# Patient Record
Sex: MALE | Race: WHITE | NOT HISPANIC OR LATINO | Employment: FULL TIME | URBAN - METROPOLITAN AREA
[De-identification: names, ages, dates, MRNs, and addresses within clinical notes are randomized per-mention and may not be internally consistent; named-entity substitution may affect disease eponyms.]

---

## 2018-09-20 ENCOUNTER — OFFICE VISIT (OUTPATIENT)
Dept: FAMILY MEDICINE CLINIC | Facility: OTHER | Age: 28
End: 2018-09-20
Payer: COMMERCIAL

## 2018-09-20 VITALS
OXYGEN SATURATION: 98 % | BODY MASS INDEX: 24.97 KG/M2 | TEMPERATURE: 97.3 F | HEIGHT: 70 IN | WEIGHT: 174.44 LBS | HEART RATE: 63 BPM | DIASTOLIC BLOOD PRESSURE: 76 MMHG | SYSTOLIC BLOOD PRESSURE: 118 MMHG

## 2018-09-20 DIAGNOSIS — Z00.00 WELL ADULT EXAM: Primary | ICD-10-CM

## 2018-09-20 DIAGNOSIS — Z87.898 HISTORY OF SEIZURE: ICD-10-CM

## 2018-09-20 DIAGNOSIS — I47.1 PAROXYSMAL SUPRAVENTRICULAR TACHYCARDIA (HCC): ICD-10-CM

## 2018-09-20 DIAGNOSIS — Z13.220 SCREENING, LIPID: ICD-10-CM

## 2018-09-20 DIAGNOSIS — Z13.1 SCREENING FOR DIABETES MELLITUS: ICD-10-CM

## 2018-09-20 PROCEDURE — 99385 PREV VISIT NEW AGE 18-39: CPT | Performed by: FAMILY MEDICINE

## 2018-09-20 NOTE — PROGRESS NOTES
Subjective:      Patient ID: Ruby Palomo is a 25 y o  male  HPI   Pt presents for annual physical/wellness exam  Overall health good  Regular eye/dental exams  Diet: healthy, diverse -- protein from lean meat/fish   Exercise: cardio/strength a few times per wk  Colonoscopy: n/a  Last PSA: n/a  Vaccines: Tdap 2014      The following portions of the patient's history were reviewed and updated as appropriate: allergies, current medications, past family history, past medical history, past social history, past surgical history and problem list     No current outpatient prescriptions on file  Review of Systems   Constitutional: Negative for appetite change, fatigue, fever and unexpected weight change  HENT: Negative for congestion, dental problem, ear pain, postnasal drip, sore throat and tinnitus  Eyes: Negative for pain, discharge and visual disturbance  Respiratory: Negative for cough, shortness of breath and wheezing  Cardiovascular: Negative for chest pain, palpitations and leg swelling  Gastrointestinal: Negative for abdominal pain, constipation, diarrhea, nausea and vomiting  Endocrine: Negative for cold intolerance and heat intolerance  Genitourinary: Negative for difficulty urinating, dysuria, flank pain and urgency  Musculoskeletal: Negative for arthralgias, back pain, joint swelling and myalgias  Skin: Negative for rash and wound  Allergic/Immunologic: Negative for immunocompromised state  Neurological: Negative for dizziness, syncope, speech difficulty, weakness and numbness  Hematological: Negative for adenopathy  Does not bruise/bleed easily  Psychiatric/Behavioral: Negative for confusion, dysphoric mood and sleep disturbance  The patient is not nervous/anxious            Objective:      /76 (BP Location: Left arm, Patient Position: Sitting, Cuff Size: Adult)   Pulse 63   Temp (!) 97 3 °F (36 3 °C) (Tympanic)   Ht 5' 9 5" (1 765 m)   Wt 79 1 kg (174 lb 7 oz)   SpO2 98%   BMI 25 39 kg/m²          Physical Exam   Constitutional: He is oriented to person, place, and time  He appears well-developed and well-nourished  No distress  HENT:   Head: Normocephalic and atraumatic  Right Ear: Hearing, tympanic membrane, external ear and ear canal normal    Left Ear: Hearing, tympanic membrane, external ear and ear canal normal    Nose: Nose normal    Mouth/Throat: Uvula is midline, oropharynx is clear and moist and mucous membranes are normal  No oropharyngeal exudate  Eyes: Conjunctivae and EOM are normal  Pupils are equal, round, and reactive to light  No scleral icterus  Neck: Normal range of motion  Neck supple  No thyromegaly present  Cardiovascular: Normal rate, regular rhythm and normal heart sounds  No murmur heard  Pulmonary/Chest: Effort normal and breath sounds normal  No respiratory distress  He has no wheezes  He has no rales  Abdominal: Soft  Bowel sounds are normal  He exhibits no mass  There is no tenderness  Musculoskeletal: Normal range of motion  He exhibits no edema or tenderness  Lymphadenopathy:     He has no cervical adenopathy  Neurological: He is alert and oriented to person, place, and time  He has normal reflexes  No cranial nerve deficit  Skin: Skin is warm and dry  No rash noted  No erythema  Psychiatric: He has a normal mood and affect  His behavior is normal    Vitals reviewed  Assessment/Plan:   Diagnoses and all orders for this visit:    Well adult exam    Paroxysmal supraventricular tachycardia ([address] )    History of seizure    Screening, lipid  -     Lipid panel; Future    Screening for diabetes mellitus  -     Comprehensive metabolic panel; Future          Healthy appearing 75-year-old male presents to establish care  Reviewed the importance of healthy diet and regular exercise in maintaining overall health  History of SVT and seizure stable at this time without medication    Will check routine screening blood work to assess lipid status and screen for diabetes  Tdap given in 2014--flu shot declined today  RTO 1 yr for well adult    The patient indicates understanding of these issues and agrees with the plan          Rubina Ron, DO

## 2018-10-09 LAB
ALBUMIN SERPL-MCNC: 4.3 G/DL (ref 3.6–5.1)
ALBUMIN/GLOB SERPL: 1.5 (CALC) (ref 1–2.5)
ALP SERPL-CCNC: 27 U/L (ref 40–115)
ALT SERPL-CCNC: 17 U/L (ref 9–46)
AST SERPL-CCNC: 16 U/L (ref 10–40)
BILIRUB SERPL-MCNC: 0.7 MG/DL (ref 0.2–1.2)
BUN SERPL-MCNC: 17 MG/DL (ref 7–25)
BUN/CREAT SERPL: ABNORMAL (CALC) (ref 6–22)
CALCIUM SERPL-MCNC: 9.6 MG/DL (ref 8.6–10.3)
CHLORIDE SERPL-SCNC: 103 MMOL/L (ref 98–110)
CHOLEST SERPL-MCNC: 150 MG/DL
CHOLEST/HDLC SERPL: 2.3 (CALC)
CO2 SERPL-SCNC: 29 MMOL/L (ref 20–32)
CREAT SERPL-MCNC: 0.93 MG/DL (ref 0.6–1.35)
GLOBULIN SER CALC-MCNC: 2.8 G/DL (CALC) (ref 1.9–3.7)
GLUCOSE SERPL-MCNC: 95 MG/DL (ref 65–99)
HDLC SERPL-MCNC: 64 MG/DL
LDLC SERPL CALC-MCNC: 72 MG/DL (CALC)
NONHDLC SERPL-MCNC: 86 MG/DL (CALC)
POTASSIUM SERPL-SCNC: 4.2 MMOL/L (ref 3.5–5.3)
PROT SERPL-MCNC: 7.1 G/DL (ref 6.1–8.1)
SL AMB EGFR AFRICAN AMERICAN: 127 ML/MIN/1.73M2
SL AMB EGFR NON AFRICAN AMERICAN: 110 ML/MIN/1.73M2
SODIUM SERPL-SCNC: 139 MMOL/L (ref 135–146)
TRIGL SERPL-MCNC: 61 MG/DL

## 2018-10-11 ENCOUNTER — TELEPHONE (OUTPATIENT)
Dept: FAMILY MEDICINE CLINIC | Facility: OTHER | Age: 28
End: 2018-10-11

## 2018-10-11 NOTE — TELEPHONE ENCOUNTER
Pt notified   ----- Message from Alley Cadena MD sent at 10/10/2018  4:49 PM EDT -----  The blood work result is normal

## 2022-05-02 ENCOUNTER — OFFICE VISIT (OUTPATIENT)
Dept: FAMILY MEDICINE CLINIC | Facility: CLINIC | Age: 32
End: 2022-05-02
Payer: COMMERCIAL

## 2022-05-02 VITALS
TEMPERATURE: 98.9 F | HEIGHT: 69 IN | WEIGHT: 175 LBS | SYSTOLIC BLOOD PRESSURE: 118 MMHG | RESPIRATION RATE: 16 BRPM | BODY MASS INDEX: 25.92 KG/M2 | HEART RATE: 76 BPM | OXYGEN SATURATION: 98 % | DIASTOLIC BLOOD PRESSURE: 80 MMHG

## 2022-05-02 DIAGNOSIS — J06.9 ACUTE URI: Primary | ICD-10-CM

## 2022-05-02 PROCEDURE — 3008F BODY MASS INDEX DOCD: CPT | Performed by: FAMILY MEDICINE

## 2022-05-02 PROCEDURE — 99203 OFFICE O/P NEW LOW 30 MIN: CPT | Performed by: FAMILY MEDICINE

## 2022-05-02 PROCEDURE — 1036F TOBACCO NON-USER: CPT | Performed by: FAMILY MEDICINE

## 2022-05-02 PROCEDURE — 3725F SCREEN DEPRESSION PERFORMED: CPT | Performed by: FAMILY MEDICINE

## 2022-05-02 RX ORDER — AZITHROMYCIN 250 MG/1
TABLET, FILM COATED ORAL
Qty: 6 TABLET | Refills: 0 | Status: SHIPPED | OUTPATIENT
Start: 2022-05-02 | End: 2022-05-07

## 2022-05-02 NOTE — ASSESSMENT & PLAN NOTE
Pt has had sxs for close to 10 days now  Getting better but still has congestion  Will start abxs and pt to continue rest and fluids  Pt to take advil as needed for discomfort and can continue daquil/nyquil for other sxs  Call if worsens  Pt also advise to do home COVID test to check if it was COVID

## 2022-05-02 NOTE — PROGRESS NOTES
BMI Counseling: Body mass index is 25 84 kg/m²  The BMI is above normal  Nutrition recommendations include decreasing portion sizes, encouraging healthy choices of fruits and vegetables, consuming healthier snacks and moderation in carbohydrate intake  Exercise recommendations include exercising 3-5 times per week  No pharmacotherapy was ordered  Rationale for BMI follow-up plan is due to patient being overweight or obese  Depression Screening and Follow-up Plan: Patient was screened for depression during today's encounter  They screened negative with a PHQ-2 score of 0  Assessment/Plan:         Problem List Items Addressed This Visit        Respiratory    Acute URI - Primary     Pt has had sxs for close to 10 days now  Getting better but still has congestion  Will start abxs and pt to continue rest and fluids  Pt to take advil as needed for discomfort and can continue daquil/nyquil for other sxs  Call if worsens  Pt also advise to do home COVID test to check if it was COVID  Relevant Medications    azithromycin (Zithromax) 250 mg tablet            Subjective:      Patient ID: Arthur Flower is a 33 y o  male  Pt here for cold sxs starting on 4/23  Had low grade fever for 1-2 days and had aches and fatigue  Doing better wuth those sxs now  Still has sinus congestion, sinus pressure, and pain in teeth  Has yellow mucus  Has mild cough as well  Had sick contacts(son)  Taking daquil as needed  Not getting better  No sob or chest tightness  No n/v/d  No loss of taste or smell  Hasn't had COVID vaccines  Had Kiran in Nov 2021  The following portions of the patient's history were reviewed and updated as appropriate:   Past Medical History:  He has a past medical history of Anxiety, Concussion, Seizures ([facility] ), and SVT (supraventricular tachycardia) ([facility] [address] ) ([id number])  ,  _______________________________________________________________________  Medical Problems:  does not have any pertinent problems on file ,  _______________________________________________________________________  Past Surgical History:   has a past surgical history that includes [employer] tooth extraction and Hernia repair  ,  _______________________________________________________________________  Family History:  family history includes Breast cancer in his paternal grandmother; Heart Valve Disease in his mother; Heart attack in his maternal grandfather and paternal grandfather; Heart disease in his maternal grandfather and paternal grandfather; Hypertension in his father; No Known Problems in his brother, brother, daughter, maternal grandmother, and son ,  _______________________________________________________________________  Social History:   reports that he has never smoked  He has never used smokeless tobacco  He reports that he does not drink alcohol and does not use drugs  ,  _______________________________________________________________________  Allergies:  has No Known Allergies     _______________________________________________________________________  Current Outpatient Medications   Medication Sig Dispense Refill    azithromycin (Zithromax) 250 mg tablet Take 2 tablets (500 mg total) by mouth daily for 1 day, THEN 1 tablet (250 mg total) daily for 4 days  6 tablet 0     No current facility-administered medications for this visit      _______________________________________________________________________  Review of Systems   Constitutional: Negative for chills, fatigue and fever  HENT: Positive for congestion and sinus pressure  Negative for ear pain, postnasal drip, rhinorrhea, sinus pain, sore throat and trouble swallowing  Respiratory: Positive for cough  Negative for chest tightness, shortness of breath and wheezing  Cardiovascular: Negative for chest pain  Gastrointestinal: Negative for abdominal pain, diarrhea, nausea and vomiting  Musculoskeletal: Negative for arthralgias  Skin: Negative for rash  Neurological: Negative for dizziness and headaches  Objective:  Vitals:    05/02/22 1356   BP: 118/80   BP Location: Left arm   Patient Position: Sitting   Cuff Size: Standard   Pulse: 76   Resp: 16   Temp: 98 9 °F (37 2 °C)   TempSrc: Temporal   SpO2: 98%   Weight: 79 4 kg (175 lb)   Height: 5' 9" (1 753 m)     Body mass index is 25 84 kg/m²  Physical Exam  Vitals and nursing note reviewed  Constitutional:       Appearance: He is well-developed  He is not ill-appearing or toxic-appearing  HENT:      Right Ear: Tympanic membrane and ear canal normal       Left Ear: Tympanic membrane and ear canal normal       Nose: Congestion present  No rhinorrhea  Comments: TTP in cheeks and temples     Mouth/Throat:      Mouth: Mucous membranes are moist  No oral lesions  Pharynx: Oropharynx is clear  Uvula midline  No oropharyngeal exudate, posterior oropharyngeal erythema or uvula swelling  Tonsils: No tonsillar exudate  Cardiovascular:      Rate and Rhythm: Normal rate and regular rhythm  Heart sounds: Normal heart sounds  No murmur heard  Pulmonary:      Effort: Pulmonary effort is normal  No respiratory distress  Breath sounds: Rhonchi present  No wheezing  Musculoskeletal:      Cervical back: Normal range of motion and neck supple  Lymphadenopathy:      Cervical: Cervical adenopathy present

## 2022-10-11 PROBLEM — J06.9 ACUTE URI: Status: RESOLVED | Noted: 2022-05-02 | Resolved: 2022-10-11

## 2023-11-30 ENCOUNTER — OFFICE VISIT (OUTPATIENT)
Dept: FAMILY MEDICINE CLINIC | Facility: CLINIC | Age: 33
End: 2023-11-30
Payer: COMMERCIAL

## 2023-11-30 VITALS
HEIGHT: 69 IN | RESPIRATION RATE: 16 BRPM | BODY MASS INDEX: 26.96 KG/M2 | TEMPERATURE: 96.7 F | DIASTOLIC BLOOD PRESSURE: 72 MMHG | OXYGEN SATURATION: 98 % | HEART RATE: 61 BPM | SYSTOLIC BLOOD PRESSURE: 118 MMHG | WEIGHT: 182 LBS

## 2023-11-30 DIAGNOSIS — J06.9 URI, ACUTE: ICD-10-CM

## 2023-11-30 DIAGNOSIS — J06.9 ACUTE URI: Primary | ICD-10-CM

## 2023-11-30 PROCEDURE — 99213 OFFICE O/P EST LOW 20 MIN: CPT | Performed by: FAMILY MEDICINE

## 2023-11-30 RX ORDER — AZITHROMYCIN 250 MG/1
TABLET, FILM COATED ORAL
Qty: 6 TABLET | Refills: 0 | Status: SHIPPED | OUTPATIENT
Start: 2023-11-30 | End: 2023-12-05

## 2023-11-30 RX ORDER — DEXTROMETHORPHAN HYDROBROMIDE AND PROMETHAZINE HYDROCHLORIDE 15; 6.25 MG/5ML; MG/5ML
5 SYRUP ORAL
Qty: 60 ML | Refills: 1 | Status: SHIPPED | OUTPATIENT
Start: 2023-11-30

## 2023-11-30 NOTE — PROGRESS NOTES
Name: Elizabeth Mcbride      : 1990      MRN: [id number]  Encounter Provider: Kaley Orellana MD  Encounter Date: 2023   Encounter department: [facility] MEDICINE    Assessment & Plan     1. Acute URI  -     azithromycin (ZITHROMAX) 250 mg tablet; Take 2 tablets today then 1 tablet daily x 4 days  -     promethazine-dextromethorphan (PHENERGAN-DM) 6.25-15 mg/5 mL oral syrup; Take 5 mL by mouth daily at bedtime    2. URI, acute  Assessment & Plan:  Persistent cough  almost 2 weeks will treat zpak  and cough  syrup for nighttime          Depression Screening and Follow-up Plan: Patient was screened for depression during today's encounter. They screened negative with a PHQ-2 score of 0. Subjective      Cough  Pertinent negatives include no chest pain, chills, ear pain, fever, headaches, myalgias, rhinorrhea, sore throat, shortness of breath or wheezing. pt with 10 -12 days of cough with productive  children at home with similar symptoms non smoker  no fever ; cough worse at night   Review of Systems   Constitutional:  Negative for chills and fever. HENT:  Negative for congestion, ear pain, mouth sores, rhinorrhea, sinus pressure, sinus pain, sore throat and trouble swallowing. Eyes:  Negative for discharge. Respiratory:  Positive for cough. Negative for chest tightness, shortness of breath and wheezing. Cardiovascular:  Negative for chest pain. Musculoskeletal:  Negative for arthralgias and myalgias. Neurological:  Negative for headaches. No current outpatient medications on file prior to visit. Objective     /72 (BP Location: Left arm, Patient Position: Sitting, Cuff Size: Standard)   Pulse 61   Temp (!) 96.7 °F (35.9 °C) (Tympanic)   Resp 16   Ht 5' 9" (1.753 m)   Wt 82.6 kg (182 lb)   SpO2 98%   BMI 26.88 kg/m²     Physical Exam  Constitutional:       General: He is not in acute distress. Appearance: Normal appearance.  He is well-developed. He is not ill-appearing. HENT:      Right Ear: Tympanic membrane and ear canal normal.      Left Ear: Tympanic membrane and ear canal normal.      Nose: Nose normal.      Right Sinus: No maxillary sinus tenderness or frontal sinus tenderness. Left Sinus: No maxillary sinus tenderness or frontal sinus tenderness. Mouth/Throat:      Mouth: Mucous membranes are moist.      Pharynx: No oropharyngeal exudate or posterior oropharyngeal erythema. Tonsils: No tonsillar exudate. Eyes:      General:         Right eye: No discharge. Left eye: No discharge. Conjunctiva/sclera: Conjunctivae normal.      Pupils: Pupils are equal, round, and reactive to light. Cardiovascular:      Rate and Rhythm: Normal rate. Heart sounds: Normal heart sounds. Pulmonary:      Effort: Pulmonary effort is normal.      Breath sounds: Normal breath sounds. Musculoskeletal:      Cervical back: Normal range of motion. Lymphadenopathy:      Cervical: No cervical adenopathy. Neurological:      General: No focal deficit present. Mental Status: He is alert. Mental status is at baseline.    Psychiatric:         Mood and Affect: Mood normal.       Brooklyn Webster MD

## 2024-01-29 PROBLEM — J06.9 URI, ACUTE: Status: RESOLVED | Noted: 2023-11-30 | Resolved: 2024-01-29

## 2024-04-10 ENCOUNTER — OFFICE VISIT (OUTPATIENT)
Dept: CARDIOLOGY CLINIC | Facility: CLINIC | Age: 34
End: 2024-04-10
Payer: COMMERCIAL

## 2024-04-10 VITALS
DIASTOLIC BLOOD PRESSURE: 76 MMHG | OXYGEN SATURATION: 96 % | WEIGHT: 187.2 LBS | HEIGHT: 69 IN | HEART RATE: 66 BPM | SYSTOLIC BLOOD PRESSURE: 122 MMHG | BODY MASS INDEX: 27.73 KG/M2 | TEMPERATURE: 98 F

## 2024-04-10 DIAGNOSIS — I47.10 PAROXYSMAL SUPRAVENTRICULAR TACHYCARDIA: Primary | ICD-10-CM

## 2024-04-10 PROCEDURE — 99203 OFFICE O/P NEW LOW 30 MIN: CPT | Performed by: INTERNAL MEDICINE

## 2024-04-10 PROCEDURE — 93000 ELECTROCARDIOGRAM COMPLETE: CPT | Performed by: INTERNAL MEDICINE

## 2024-04-10 NOTE — PROGRESS NOTES
[facility]    CHIEF COMPLAINT: No chief complaint on file.      HPI:  Cliff Ashton is a 33 y.o. male with a past medical history of paroxysmal supraventricular tachycardia.  He was diagnosed with this around the age of 18 and reports having episodes of palpitations since his teenage years.  Episodes were infrequent and would last for several seconds at a time.  He does not feel there were many episodes that have lasted more than 10 seconds. No associated syncope, chest pain, shortness of breath.    Over the past year, he feels episodes are occurring a bit more frequently perhaps once per week..  They tend to be worse with stress.  Episodes of palpitations also seem to be aggravated with lack of sleep and dehydration. No episodes of lasted for greater than 1 minute.  Palpitations typically resolve with Valsalva maneuver. He has been trying to limit caffeine to 1 cup of coffee per day because it has triggered symptoms in the past.      Exercise: He runs twice per week and lifts weights 2-3 times per week.  During exercise his heart rates can go up to 160-170 bpm.    Currently denies visual changes, lightheadedness, syncope, chest pain, shortness of breath at rest or with exertion, orthopnea, PND, lower extremity swelling.  He denies any known snoring history or witnessed apneas.    Social history: Non smoker.   Family history: No family history of premature CAD. Father may have SVT. Paternal aunt has SVT - s/p ablation.     The following portions of the patient's history were reviewed and updated as appropriate: allergies, current medications, past family history, past medical history, past social history, past surgical history, and problem list.    SINCE LAST OV I REVIEWED WITH THE PATIENT THE INTERIM LABS, TEST RESULTS, CONSULTANT(S) NOTES AND PERFORMED AN INTERIM REVIEW OF HISTORY    Past Medical History:   Diagnosis Date    Anxiety     Concussion     Seizures (HCC)     Between ages 2-5 and  once in 2014 after a concussion    SVT (supraventricular tachycardia) [id number]       Past Surgical History:   Procedure Laterality Date    HERNIA REPAIR      bilateral inguinal hernias    WISDOM TOOTH EXTRACTION         Social History     Socioeconomic History    Marital status: /Civil Union     Spouse name: Not on file    Number of children: Not on file    Years of education: Not on file    Highest education level: Not on file   Occupational History    Not on file   Tobacco Use    Smoking status: Never    Smokeless tobacco: Never   Vaping Use    Vaping status: Never Used   Substance and Sexual Activity    Alcohol use: Never     Comment: occational    Drug use: No    Sexual activity: Yes     Partners: Female   Other Topics Concern    Not on file   Social History Narrative    Not on file     Social Determinants of Health     Financial Resource Strain: Not on file   Food Insecurity: Not on file   Transportation Needs: Not on file   Physical Activity: Not on file   Stress: Not on file   Social Connections: Not on file   Intimate Partner Violence: Not on file   Housing Stability: Not on file       Family History   Problem Relation Age of Onset    Heart Valve Disease Mother     Hypertension Father     No Known Problems Brother     No Known Problems Brother     No Known Problems Son     No Known Problems Daughter     No Known Problems Maternal Grandmother     Heart disease Maternal Grandfather     Heart attack Maternal Grandfather     Breast cancer Paternal Grandmother     Heart disease Paternal Grandfather     Heart attack Paternal Grandfather        Allergies   Allergen Reactions    Cat Hair Extract Allergic Rhinitis       Current Outpatient Medications   Medication Sig Dispense Refill    promethazine-dextromethorphan (PHENERGAN-DM) 6.25-15 mg/5 mL oral syrup Take 5 mL by mouth daily at bedtime (Patient not taking: Reported on 4/10/2024) 60 mL 1     No current facility-administered medications for this visit.  "      /76 (BP Location: Left arm, Patient Position: Sitting, Cuff Size: Adult)   Pulse 66   Temp 98 °F (36.7 °C)   Ht 5' 9\" (1.753 m)   Wt 84.9 kg (187 lb 3.2 oz)   SpO2 96%   BMI 27.64 kg/m²     Review of Systems   All other systems reviewed and are negative.      Physical Exam  Vitals reviewed.   Constitutional:       General: He is not in acute distress.     Appearance: Normal appearance. He is well-developed. He is not toxic-appearing.   HENT:      Head: Normocephalic and atraumatic.   Eyes:      General: No scleral icterus.     Extraocular Movements: Extraocular movements intact.      Conjunctiva/sclera: Conjunctivae normal.   Cardiovascular:      Rate and Rhythm: Normal rate and regular rhythm.      Pulses: Normal pulses.      Heart sounds: Normal heart sounds. No murmur heard.     No gallop.   Pulmonary:      Effort: Pulmonary effort is normal. No respiratory distress.      Breath sounds: Normal breath sounds. No wheezing or rales.   Abdominal:      General: Bowel sounds are normal. There is no distension.      Palpations: Abdomen is soft.      Tenderness: There is no abdominal tenderness. There is no guarding.   Musculoskeletal:      Right lower leg: No edema.      Left lower leg: No edema.   Skin:     General: Skin is warm and dry.      Coloration: Skin is not jaundiced or pale.   Neurological:      Mental Status: He is alert.   Psychiatric:         Mood and Affect: Mood normal.         Behavior: Behavior normal.          Lab Results   Component Value Date    K 4.2 10/09/2018     10/09/2018    CO2 29 10/09/2018    BUN 17 10/09/2018    CREATININE 0.93 10/09/2018    CALCIUM 9.6 10/09/2018    ALT 17 10/09/2018    AST 16 10/09/2018       Lab Results   Component Value Date    HDL 64 10/09/2018    LDLCALC 72 10/09/2018    TRIG 61 10/09/2018     Cardiac studies:   Results for orders placed or performed in visit on 04/10/24   POCT ECG    Impression    Normal sinus rhythm        ASSESSMENT AND " PLAN:  Diagnoses and all orders for this visit:    Paroxysmal supraventricular tachycardia  -     POCT ECG      33-year-old gentleman with a past medical history of paroxysmal SVT who presents today in consultation. He has a history of pSVT diagnosed in his 20s.  Symptoms are usually short-lived but he does feel they were occurring a bit more frequently this year.  Triggers tend to be stress, dehydration, lack of sleep, and caffeine.  He has been trying to make adjustments in lifestyle and appears to have improvement in symptoms.  He also typically experiences relief with Valsalva maneuver.   -Discussed a cardiac monitor and treatment strategies, but would like to defer unless worsening frequency or duration of episodes   -We discussed importance of maintaining adequate hydration, good sleep hygiene, limiting caffeine and alcohol use, etc.    Sneha Ordaz MD

## 2024-09-11 ENCOUNTER — OFFICE VISIT (OUTPATIENT)
Dept: FAMILY MEDICINE CLINIC | Facility: CLINIC | Age: 34
End: 2024-09-11
Payer: COMMERCIAL

## 2024-09-11 VITALS
DIASTOLIC BLOOD PRESSURE: 78 MMHG | WEIGHT: 178 LBS | HEIGHT: 69 IN | HEART RATE: 91 BPM | SYSTOLIC BLOOD PRESSURE: 120 MMHG | BODY MASS INDEX: 26.36 KG/M2 | RESPIRATION RATE: 16 BRPM | TEMPERATURE: 97.6 F | OXYGEN SATURATION: 96 %

## 2024-09-11 DIAGNOSIS — J02.9 SORE THROAT: Primary | ICD-10-CM

## 2024-09-11 DIAGNOSIS — J02.0 STREP PHARYNGITIS: ICD-10-CM

## 2024-09-11 LAB — S PYO AG THROAT QL: POSITIVE

## 2024-09-11 PROCEDURE — 99213 OFFICE O/P EST LOW 20 MIN: CPT | Performed by: FAMILY MEDICINE

## 2024-09-11 PROCEDURE — 87880 STREP A ASSAY W/OPTIC: CPT | Performed by: FAMILY MEDICINE

## 2024-09-11 RX ORDER — AMOXICILLIN 875 MG
875 TABLET ORAL 2 TIMES DAILY
Qty: 20 TABLET | Refills: 0 | Status: SHIPPED | OUTPATIENT
Start: 2024-09-11 | End: 2024-09-21

## 2024-09-11 RX ORDER — AMOXICILLIN 500 MG/1
500 CAPSULE ORAL 3 TIMES DAILY
COMMUNITY
Start: 2024-08-14 | End: 2024-09-11

## 2024-09-11 NOTE — ASSESSMENT & PLAN NOTE
Amoxicillin 875 bid    Orders:    amoxicillin (AMOXIL) 875 mg tablet; Take 1 tablet (875 mg total) by mouth 2 (two) times a day for 10 days

## 2024-09-11 NOTE — PROGRESS NOTES
"Ambulatory Visit  Name: Cliff Ashton      : 1990      MRN: [id number]  Encounter Provider: Margy Schmidt MD  Encounter Date: 2024   Encounter department: [facility]    Assessment & Plan  Sore throat    Orders:    POCT rapid ANTIGEN strepA    Strep pharyngitis  Amoxicillin 875 bid    Orders:    amoxicillin (AMOXIL) 875 mg tablet; Take 1 tablet (875 mg total) by mouth 2 (two) times a day for 10 days       History of Present Illness     Pt with several days of sore throat low grade fever body aches no cough no rash no GI symptoms no SOB pt's children both have HFM coxsackie infection for about 1 week     Sore Throat   Pertinent negatives include no congestion, coughing, ear pain, headaches, shortness of breath or trouble swallowing.         Review of Systems   Constitutional:  Positive for fever (low grade). Negative for chills.   HENT:  Positive for sore throat. Negative for congestion, ear pain, mouth sores, rhinorrhea, sinus pressure, sinus pain and trouble swallowing.    Eyes:  Negative for discharge.   Respiratory:  Negative for cough, chest tightness and shortness of breath.    Cardiovascular:  Negative for chest pain.   Musculoskeletal:  Positive for myalgias. Negative for arthralgias.   Skin:  Negative for rash.   Neurological:  Negative for headaches.           Objective     /78 (BP Location: Left arm, Patient Position: Sitting, Cuff Size: Standard)   Pulse 91   Temp 97.6 °F (36.4 °C) (Tympanic)   Resp 16   Ht 5' 9\" (1.753 m)   Wt 80.7 kg (178 lb)   SpO2 96%   BMI 26.29 kg/m²     Physical Exam  Vitals and nursing note reviewed.   Constitutional:       Appearance: He is well-developed.   HENT:      Head: Normocephalic.      Right Ear: Tympanic membrane normal.      Left Ear: Tympanic membrane normal.      Nose: Mucosal edema present. No congestion or rhinorrhea.      Right Sinus: Maxillary sinus tenderness and frontal sinus tenderness present.      " Left Sinus: Maxillary sinus tenderness and frontal sinus tenderness present.      Mouth/Throat:      Pharynx: Posterior oropharyngeal erythema present. No oropharyngeal exudate.   Eyes:      Conjunctiva/sclera: Conjunctivae normal.   Cardiovascular:      Rate and Rhythm: Normal rate and regular rhythm.      Heart sounds: Normal heart sounds.   Pulmonary:      Effort: Pulmonary effort is normal. No respiratory distress.      Breath sounds: No wheezing.   Abdominal:      Tenderness: There is no abdominal tenderness.   Musculoskeletal:      Cervical back: Neck supple.   Lymphadenopathy:      Cervical: No cervical adenopathy.   Skin:     Findings: No rash.

## 2024-12-18 PROBLEM — J02.0 STREP PHARYNGITIS: Status: RESOLVED | Noted: 2024-09-11 | Resolved: 2024-12-18

## 2024-12-19 ENCOUNTER — OFFICE VISIT (OUTPATIENT)
Dept: FAMILY MEDICINE CLINIC | Facility: CLINIC | Age: 34
End: 2024-12-19
Payer: COMMERCIAL

## 2024-12-19 VITALS
SYSTOLIC BLOOD PRESSURE: 118 MMHG | HEIGHT: 69 IN | DIASTOLIC BLOOD PRESSURE: 80 MMHG | OXYGEN SATURATION: 98 % | WEIGHT: 182 LBS | BODY MASS INDEX: 26.96 KG/M2 | HEART RATE: 78 BPM | RESPIRATION RATE: 16 BRPM

## 2024-12-19 DIAGNOSIS — R09.A2 GLOBUS SENSATION: Primary | ICD-10-CM

## 2024-12-19 DIAGNOSIS — M72.2 PLANTAR FASCIITIS, BILATERAL: ICD-10-CM

## 2024-12-19 PROCEDURE — 99213 OFFICE O/P EST LOW 20 MIN: CPT | Performed by: FAMILY MEDICINE

## 2024-12-19 RX ORDER — NAPROXEN 500 MG/1
500 TABLET ORAL 2 TIMES DAILY WITH MEALS
Qty: 30 TABLET | Refills: 0 | Status: SHIPPED | OUTPATIENT
Start: 2024-12-19

## 2024-12-19 NOTE — ASSESSMENT & PLAN NOTE
Patient has had it for months off and on. Will try NSAID, heel pads, and stretching. If no better, will refer to Podiatry.   Orders:  •  naproxen (NAPROSYN) 500 mg tablet; Take 1 tablet (500 mg total) by mouth 2 (two) times a day with meals

## 2024-12-19 NOTE — PROGRESS NOTES
Name: Cliff Ashton      : 1990      MRN: [id number]  Encounter Provider: Tin Charles MD  Encounter Date: 2024   Encounter department: [facility]  :  Assessment & Plan  Globus sensation  Patient has had it for 2 weeks. Not a smoker. Exam normal today. Could be from increased anxiety or post-nasal drip. Will try allergy medication and NSAID. If no better, will refer to ENT.        Plantar fasciitis, bilateral  Patient has had it for months off and on. Will try NSAID, heel pads, and stretching. If no better, will refer to Podiatry.   Orders:  •  naproxen (NAPROSYN) 500 mg tablet; Take 1 tablet (500 mg total) by mouth 2 (two) times a day with meals          Depression Screening and Follow-up Plan: Patient was screened for depression during today's encounter. They screened negative with a PHQ-2 score of 1.      History of Present Illness     Patient here for sensation of lump in throat for past 2 weeks off and on. No pain. Not a smoker. Has post-nasal drip at times. No fever or chills. No cough. No other symptoms except feels like mucus there at times. Getting bigger. Under increased stress at work. Not taking any medications for throat. Patient also has heel pain bilateral and worse in AM when wakes up.       Review of Systems   Constitutional:  Negative for chills, fatigue, fever and unexpected weight change.   HENT:  Positive for postnasal drip. Negative for congestion, ear pain, rhinorrhea, sinus pressure, sinus pain, sore throat and trouble swallowing.         Lump in throat   Respiratory:  Negative for cough, chest tightness, shortness of breath and wheezing.    Cardiovascular:  Negative for chest pain.   Gastrointestinal:  Negative for abdominal pain, constipation, diarrhea, nausea and vomiting.   Musculoskeletal:  Negative for arthralgias.        Heel pain b/l   Skin:  Negative for rash.   Neurological:  Negative for dizziness and headaches.  "  Psychiatric/Behavioral:  Negative for dysphoric mood. The patient is not nervous/anxious.        Objective   /80 (BP Location: Left arm, Patient Position: Sitting, Cuff Size: Standard)   Pulse 78   Resp 16   Ht 5' 9\" (1.753 m)   Wt 82.6 kg (182 lb)   SpO2 98%   BMI 26.88 kg/m²      Physical Exam  Constitutional:       General: He is not in acute distress.     Appearance: Normal appearance. He is not ill-appearing.   HENT:      Right Ear: Tympanic membrane, ear canal and external ear normal.      Left Ear: Tympanic membrane, ear canal and external ear normal.      Nose: No congestion or rhinorrhea.      Mouth/Throat:      Mouth: Mucous membranes are moist.      Pharynx: Oropharynx is clear. No posterior oropharyngeal erythema.   Cardiovascular:      Rate and Rhythm: Normal rate and regular rhythm.      Heart sounds: Normal heart sounds. No murmur heard.  Pulmonary:      Effort: Pulmonary effort is normal.      Breath sounds: Normal breath sounds. No wheezing or rhonchi.   Musculoskeletal:      Comments: Tenderness to palpation bilateral medial heels   Lymphadenopathy:      Cervical: No cervical adenopathy.   Neurological:      Mental Status: He is alert.         "

## 2024-12-19 NOTE — ASSESSMENT & PLAN NOTE
Patient has had it for 2 weeks. Not a smoker. Exam normal today. Could be from increased anxiety or post-nasal drip. Will try allergy medication and NSAID. If no better, will refer to ENT.

## 2025-01-09 ENCOUNTER — APPOINTMENT (OUTPATIENT)
Dept: RADIOLOGY | Facility: CLINIC | Age: 35
End: 2025-01-09
Payer: COMMERCIAL

## 2025-01-09 ENCOUNTER — OFFICE VISIT (OUTPATIENT)
Dept: URGENT CARE | Facility: CLINIC | Age: 35
End: 2025-01-09
Payer: COMMERCIAL

## 2025-01-09 VITALS
HEART RATE: 63 BPM | OXYGEN SATURATION: 99 % | RESPIRATION RATE: 16 BRPM | HEIGHT: 69 IN | SYSTOLIC BLOOD PRESSURE: 138 MMHG | TEMPERATURE: 97.6 F | DIASTOLIC BLOOD PRESSURE: 80 MMHG | WEIGHT: 182 LBS | BODY MASS INDEX: 26.96 KG/M2

## 2025-01-09 DIAGNOSIS — S92.505A CLOSED NONDISPLACED FRACTURE OF PHALANX OF LESSER TOE OF LEFT FOOT, UNSPECIFIED PHALANX, INITIAL ENCOUNTER: Primary | ICD-10-CM

## 2025-01-09 PROCEDURE — S9083 URGENT CARE CENTER GLOBAL: HCPCS | Performed by: NURSE PRACTITIONER

## 2025-01-09 PROCEDURE — 73630 X-RAY EXAM OF FOOT: CPT

## 2025-01-09 PROCEDURE — G0382 LEV 3 HOSP TYPE B ED VISIT: HCPCS | Performed by: NURSE PRACTITIONER

## 2025-01-09 NOTE — PROGRESS NOTES
"  [facility]        NAME: lCiff Ashton is a 34 y.o. male  : 1990    MRN: [id number]  DATE: 2025  TIME: 5:51 PM    Assessment and Plan   Closed nondisplaced fracture of phalanx of lesser toe of left foot, unspecified phalanx, initial encounter [S92.505A]  1. Closed nondisplaced fracture of phalanx of lesser toe of left foot, unspecified phalanx, initial encounter  XR foot 3+ vw right    XR foot 3+ vw right        Right foot x-ray completed in office.  X-rays significant for 2 fractures of the fifth phalanx.  First fractures in the distal tuft.  The second fracture is in the middle phalanx.  Both are nondisplaced.  Advised comfortable supportive footwear.  Follow-up with podiatry if no improvement.  Ibuprofen and ice.      Patient Instructions   Ice, elevate, and rest  Comfortable and supportive footwear  Ibuprofen 2-3 tablets every 6 hours  Follow up with podiatry if no improvement    Follow up with PCP in 3-5 days.  Proceed to  ER if symptoms worsen.    Chief Complaint     Chief Complaint   Patient presents with    Foot Injury     Yesterday night pt was moving stools and a stack landed on his left pinky toe.         History of Present Illness       Patient is a 34-year-old male presenting for right fifth toe injury.  He states that he was moving metal stools yesterday when the bottom 2 fell out coming down onto his foot.  Toe is ecchymotic and swollen.  He took 1 ibuprofen this morning.  States the pain is \"not that bad\".        Review of Systems   Review of Systems   Constitutional:  Negative for activity change, chills and fever.   Musculoskeletal:  Positive for arthralgias and joint swelling.   Skin:  Positive for color change. Negative for wound.         Current Medications       Current Outpatient Medications:     naproxen (NAPROSYN) 500 mg tablet, Take 1 tablet (500 mg total) by mouth 2 (two) times a day with meals (Patient not taking: Reported on 2025), Disp: 30 tablet, Rfl: " "0    Current Allergies     Allergies as of 01/09/2025 - Reviewed 01/09/2025   Allergen Reaction Noted    Cat hair extract Allergic Rhinitis 04/10/2024            The following portions of the patient's history were reviewed and updated as appropriate: allergies, current medications, past family history, past medical history, past social history, past surgical history and problem list.     Past Medical History:   Diagnosis Date    Anxiety     Concussion     Seizures (HCC)     Between ages 2-5 and once in 2014 after a concussion    SVT (supraventricular tachycardia) (HCC) [id number]       Past Surgical History:   Procedure Laterality Date    HERNIA REPAIR      bilateral inguinal hernias    WISDOM TOOTH EXTRACTION         Family History   Problem Relation Age of Onset    Heart Valve Disease Mother     Hypertension Father     No Known Problems Brother     No Known Problems Brother     No Known Problems Son     No Known Problems Daughter     Cancer Maternal Grandmother     Heart disease Maternal Grandfather     Heart attack Maternal Grandfather     Breast cancer Paternal Grandmother     Heart disease Paternal Grandfather     Heart attack Paternal Grandfather          Medications have been verified.        Objective   /80 (BP Location: Right arm, Patient Position: Sitting, Cuff Size: Standard)   Pulse 63   Temp 97.6 °F (36.4 °C) (Temporal)   Resp 16   Ht 5' 9\" (1.753 m)   Wt 82.6 kg (182 lb)   SpO2 99%   BMI 26.88 kg/m²        Physical Exam     Physical Exam  Vitals reviewed.   Constitutional:       General: He is awake. He is not in acute distress.     Appearance: Normal appearance. He is normal weight.   Cardiovascular:      Rate and Rhythm: Normal rate.   Pulmonary:      Effort: Pulmonary effort is normal.   Musculoskeletal:        Feet:    Skin:     General: Skin is warm and moist.   Neurological:      Mental Status: He is alert.   Psychiatric:         Behavior: Behavior is cooperative.                   "

## 2025-01-09 NOTE — PATIENT INSTRUCTIONS
"Ice, elevate, and rest  Comfortable and supportive footwear  Ibuprofen 2-3 tablets every 6 hours  Follow up with podiatry if no improvement    Patient Education     Toe fracture   The Basics   Written by the doctors and editors at [facility]   What is a toe fracture? -- A \"fracture\" is another word for a broken bone. A toe fracture is when a person breaks a bone in the toe (figure 1).  There are different types of fractures, depending on which bone breaks and how it breaks. When a bone breaks, it might crack, break all of the way through, or shatter.  If a broken bone sticks out of the skin or can be seen through a wound, doctors call it an \"open\" fracture. If the bone does not stick out of the skin or cannot be seen through a wound, doctors call it a \"closed\" fracture.  A toe fracture can happen if a person stubs their toe, the toe is bent to the side, or something drops on the toe.  What are the symptoms of a toe fracture? -- Symptoms depend on which bone breaks and the type of break it is. Common symptoms can include:   Pain, swelling, or bruising over the area   The toe looks abnormal, bent, or not the usual shape   Not being able to move the toe   Trouble walking or putting weight on that foot   Numbness in the area of the broken bone  Is there a test for a toe fracture? -- Yes. The doctor or nurse will ask about your symptoms, do an exam, and take an X-ray. They might also do other imaging tests, such as a CT, MRI, or ultrasound. Imaging tests create pictures of the inside of the body.  How is a toe fracture treated? -- Treatment depends, in part, on the type of toe fracture you have and how severe it is. The goal of treatment is to have the ends of the broken bone line up with each other so that the bone can heal.  If the ends of the broken bone are already in line with each other, toe fractures are usually treated with \"hugo taping\" (figure 2). Hugo taping involves taping the injured toe to the toe next to " "it. In some cases, the doctor will have you use a rigid shoe or walking boot, or place a short-leg walking cast to limit toe movement.  If the ends of your broken bone are not in line with each other, the doctor will need to line them up:   Sometimes, the doctor can move the bone to the correct position without doing surgery, and then put a splint on or hugo tape your toe. This is called \"closed fracture reduction.\"   A severe toe fracture, in which a joint is damaged or the bones do not stay in position, is treated with surgery. During surgery, the doctor puts the toe bone back in position. To do this, they can use screws, pins, wires, or plates to fix the bones inside of the toe. This is called \"open fracture reduction.\"  How long does a toe fracture take to heal? -- Most toe fractures take weeks to months to heal. The doctor or nurse will talk to you about when to return to things like work, sports, or other activities.  Healing time also depends on the person. Healthy children usually heal much more quickly than older adults or adults with other medical problems.  How do I care for myself at home? -- To care for yourself or your child at home:   Follow the doctor's instructions for hugo taping your toe. Put cotton or other soft material between your toes so they don't rub together (figure 2).   Follow the doctor's instructions for wearing a rigid shoe, walking boot, or walking cast. This supports and protects the bone as it heals. Most people can put weight on their foot and walk around while wearing the rigid shoe, walking boot, or cast.   Do not get a cast wet unless the doctor says that it is waterproof.   Follow instructions for limiting activity and movement until the bone is healed. The doctor or nurse will tell you what activities are safe to do.   Prop the injured foot on pillows, keeping it above the level of the heart. This might help lessen pain and swelling.   The doctor might recommend an " over-the-counter pain medicine. These include acetaminophen (sample brand name: Tylenol), ibuprofen (sample brand names: Advil, Motrin), and naproxen (sample brand name: Aleve).   Some people get a prescription for stronger pain medicines to take for a short time. Follow the instructions for taking these medicines.   Ice can help with pain and swelling:   Put a cold gel pack, bag of ice, or bag of frozen vegetables on the injured area every 1 to 2 hours, for 15 minutes each time. Put a thin towel between the ice (or other cold object) and the skin.   Use the ice (or other cold object) for at least 6 hours after the injury. Some people find it helpful to ice longer, even up to 2 days after their injury.   Eat a healthy diet that includes getting plenty of calcium, vitamin D, and protein (figure 3).   If you smoke, try to quit. Broken bones take longer to heal if you smoke.  When should I call the doctor? -- Call for advice if:   There is less feeling or movement in the toes or foot.   The toe becomes swollen or starts to hurt more.   The skin becomes red or irritated around the cast, or redness starts to spread up the foot.   The cast feels too tight and uncomfortable, or the toes turn pale, blue, or gray.   There is a bad smell or drainage coming from the cast.   The cast feels too loose, you notice a crack in the cast, or the cast becomes soft.   The cast gets wet, and it is not supposed to get wet.  All topics are updated as new evidence becomes available and our peer review process is complete.  This topic retrieved from [employer] on: Feb 26, 2024.  Topic 69393 Version 12.0  Release: 32.2.4 - C32.56  © 2024 UpToDate, Inc. and/or its affiliates. All rights reserved.  figure 1: Foot and ankle bones     This drawing shows the foot, toe, and anklebones.  Graphic 16776 Version 2.0  figure 2: Thai taping of the toes     Thai taping involves taping the injured toe to the toe next to it. Some cotton or other soft  "material should be put between the toes so they don't rub together.  Graphic 38839 Version 1.0  figure 3: Foods and drinks with calcium and vitamin D     Foods rich in calcium include ice cream, soy milk, breads, kale, broccoli, milk, cheese, cottage cheese, almonds, yogurt, ready-to-eat cereals, beans, and tofu. Foods rich in vitamin D include milk, fortified plant-based \"milks\" (soy, almond), canned tuna fish, cod liver oil, yogurt, ready-to-eat-cereals, cooked salmon, canned sardines, mackerel, and eggs. Some of these foods are rich in both.  Graphic 57020 Version 4.0  Consumer Information Use and Disclaimer   Disclaimer: This generalized information is a limited summary of diagnosis, treatment, and/or medication information. It is not meant to be comprehensive and should be used as a tool to help the user understand and/or assess potential diagnostic and treatment options. It does NOT include all information about conditions, treatments, medications, side effects, or risks that may apply to a specific patient. It is not intended to be medical advice or a substitute for the medical advice, diagnosis, or treatment of a health care provider based on the health care provider's examination and assessment of a patient's specific and unique circumstances. Patients must speak with a health care provider for complete information about their health, medical questions, and treatment options, including any risks or benefits regarding use of medications. This information does not endorse any treatments or medications as safe, effective, or approved for treating a specific patient. UpToDate, Inc. and its affiliates disclaim any warranty or liability relating to this information or the use thereof.The use of this information is governed by the Terms of Use, available at https://www.woltersTrigenceuwer.com/en/know/clinical-effectiveness-terms. 2024© UpToDate, Inc. and its affiliates and/or licensors. All rights reserved.  Copyright "   © 2024 [employer] and/or its affiliates. All rights reserved.

## 2025-01-10 ENCOUNTER — TELEPHONE (OUTPATIENT)
Dept: OTHER | Facility: OTHER | Age: 35
End: 2025-01-10

## 2025-01-10 ENCOUNTER — OFFICE VISIT (OUTPATIENT)
Dept: FAMILY MEDICINE CLINIC | Facility: CLINIC | Age: 35
End: 2025-01-10
Payer: COMMERCIAL

## 2025-01-10 ENCOUNTER — RESULTS FOLLOW-UP (OUTPATIENT)
Dept: URGENT CARE | Facility: CLINIC | Age: 35
End: 2025-01-10

## 2025-01-10 VITALS
HEIGHT: 69 IN | WEIGHT: 182 LBS | OXYGEN SATURATION: 97 % | SYSTOLIC BLOOD PRESSURE: 118 MMHG | BODY MASS INDEX: 26.96 KG/M2 | DIASTOLIC BLOOD PRESSURE: 80 MMHG | RESPIRATION RATE: 16 BRPM | HEART RATE: 77 BPM | TEMPERATURE: 98.3 F

## 2025-01-10 DIAGNOSIS — S92.504D CLOSED NONDISPLACED FRACTURE OF PHALANX OF LESSER TOE OF RIGHT FOOT WITH ROUTINE HEALING, UNSPECIFIED PHALANX, SUBSEQUENT ENCOUNTER: Primary | ICD-10-CM

## 2025-01-10 DIAGNOSIS — I47.10 PAROXYSMAL SUPRAVENTRICULAR TACHYCARDIA (HCC): ICD-10-CM

## 2025-01-10 DIAGNOSIS — R09.A2 GLOBUS SENSATION: ICD-10-CM

## 2025-01-10 PROBLEM — S92.505D: Status: ACTIVE | Noted: 2025-01-10

## 2025-01-10 PROCEDURE — 99213 OFFICE O/P EST LOW 20 MIN: CPT | Performed by: FAMILY MEDICINE

## 2025-01-10 NOTE — ASSESSMENT & PLAN NOTE
Xray done yesterday at [facility] advised to wear comfortable shoes  would advise hugo taping toe  podiatry for follow up

## 2025-01-10 NOTE — PROGRESS NOTES
"Name: Cliff Ashton      : 1990      MRN: [id number]  Encounter Provider: Margy Schmidt MD  Encounter Date: 1/10/2025   Encounter department: [facility]  :  Assessment & Plan  Closed nondisplaced fracture of phalanx of lesser toe of right foot with routine healing, unspecified phalanx, subsequent encounter  [facility]  would advise hugo taping toe  podiatry for follow up        Globus sensation  Pr still with complaints of this usually due to anxiety was referred to ENT pt did not go   Orders:  •  Ambulatory Referral to Otolaryngology; Future    Paroxysmal supraventricular tachycardia (HCC)  Cardiology   note   pt with no  treatment at this time pt uses valvsalva maneuver               History of Present Illness     Patient dropped metal stool on fifth toe right  foot. Went to urgent care yesterday. X-ray was done revealing two small non-displaced fractures. Patient told to wear comfortable shoes and use ibuprofen for pain. Was advised to see podiatry if pain persists. Pt also with sore throat  since December       Review of Systems   Constitutional:  Negative for fever.   HENT:  Negative for congestion and sore throat (since decemberfeelin of smething in his throat).    Respiratory:  Negative for cough.    Genitourinary:  Negative for urgency.   Musculoskeletal:         Toe fracture no pain    Psychiatric/Behavioral:  The patient is nervous/anxious (mild on off not \"enough  for meds\").        Objective   /80 (BP Location: Left arm, Patient Position: Sitting, Cuff Size: Standard)   Pulse 77   Temp 98.3 °F (36.8 °C) (Tympanic)   Resp 16   Ht 5' 9\" (1.753 m)   Wt 82.6 kg (182 lb)   SpO2 97%   BMI 26.88 kg/m²      Physical Exam  Constitutional:       Appearance: Normal appearance.   HENT:      Mouth/Throat:      Mouth: Mucous membranes are moist.      Pharynx: Oropharynx is clear.   Musculoskeletal:      " Comments: Bruising right 5th toe    Neurological:      Gait: Gait normal.

## 2025-01-10 NOTE — ASSESSMENT & PLAN NOTE
Pr still with complaints of this usually due to anxiety was referred to ENT pt did not go   Orders:  •  Ambulatory Referral to Otolaryngology; Future

## 2025-01-15 ENCOUNTER — OFFICE VISIT (OUTPATIENT)
Dept: FAMILY MEDICINE CLINIC | Facility: CLINIC | Age: 35
End: 2025-01-15
Payer: COMMERCIAL

## 2025-01-15 VITALS
DIASTOLIC BLOOD PRESSURE: 78 MMHG | BODY MASS INDEX: 26.66 KG/M2 | OXYGEN SATURATION: 98 % | WEIGHT: 180 LBS | RESPIRATION RATE: 16 BRPM | HEIGHT: 69 IN | SYSTOLIC BLOOD PRESSURE: 112 MMHG | HEART RATE: 72 BPM

## 2025-01-15 DIAGNOSIS — Z00.00 ENCOUNTER FOR ANNUAL PHYSICAL EXAM: Primary | ICD-10-CM

## 2025-01-15 DIAGNOSIS — Z00.00 PREVENTATIVE HEALTH CARE: ICD-10-CM

## 2025-01-15 DIAGNOSIS — F41.9 ANXIETY: ICD-10-CM

## 2025-01-15 DIAGNOSIS — Z13.6 SCREENING FOR CARDIOVASCULAR CONDITION: ICD-10-CM

## 2025-01-15 DIAGNOSIS — Z13.1 SCREENING FOR DIABETES MELLITUS: ICD-10-CM

## 2025-01-15 PROBLEM — S92.504D: Status: RESOLVED | Noted: 2025-01-10 | Resolved: 2025-01-15

## 2025-01-15 PROCEDURE — 99395 PREV VISIT EST AGE 18-39: CPT | Performed by: FAMILY MEDICINE

## 2025-01-15 NOTE — ASSESSMENT & PLAN NOTE
Patient has had increased anxiety due to work issues. Doesn't want medication today but will call if worsens.

## 2025-01-15 NOTE — PROGRESS NOTES
Name: Cliff Ashton      : 1990      MRN: [id number]  Encounter Provider: Tin Charles MD  Encounter Date: 1/15/2025   Encounter department: [facility]  :  Assessment & Plan  Encounter for annual physical exam  CPE done. Last Tdap was in . Declined flu shot. Recommend Gardasil series. Will check labs. Pt advised to follow a well balanced, heart healthy diet and to exercise on a regular basis. Not a smoker. Blood pressure good.   Orders:  •  Lipid panel; Future  •  Comprehensive metabolic panel; Future  •  CBC and differential; Future    Screening for cardiovascular condition    Orders:  •  Lipid panel; Future    Screening for diabetes mellitus    Orders:  •  Comprehensive metabolic panel; Future    Preventative health care    Orders:  •  Lipid panel; Future  •  Comprehensive metabolic panel; Future  •  CBC and differential; Future    Anxiety  Patient has had increased anxiety due to work issues. Doesn't want medication today but will call if worsens.              Depression Screening and Follow-up Plan: Patient was screened for depression during today's encounter. They screened negative with a PHQ-2 score of 2.      History of Present Illness     Patient here for physical. Doing well. No chest pain or shortness of breath. No headaches. No abdominal pain. Last Tdap was in . Doesn't get flu shot. Patient exercises. Not a smoker. Occasional ETOH. Still has discomfort in throat area. Sees ENT tomorrow. Plantar Fasciitis improved.       Review of Systems   Constitutional:  Negative for activity change, appetite change, fatigue and unexpected weight change.   HENT:  Negative for congestion, ear pain, rhinorrhea, sore throat and trouble swallowing.         Throat discomfort   Eyes:  Negative for pain, discharge and visual disturbance.   Respiratory:  Negative for cough, shortness of breath and wheezing.    Cardiovascular:  Negative for chest pain, palpitations and leg  "swelling.   Gastrointestinal:  Negative for abdominal pain, constipation, diarrhea, nausea and vomiting.   Endocrine: Negative for polydipsia, polyphagia and polyuria.   Genitourinary:  Negative for difficulty urinating and hematuria.   Musculoskeletal:  Negative for arthralgias, back pain, gait problem, myalgias and neck pain.   Skin:  Negative for color change and rash.   Neurological:  Negative for dizziness, weakness and headaches.   Hematological:  Negative for adenopathy. Does not bruise/bleed easily.   Psychiatric/Behavioral:  Negative for dysphoric mood and sleep disturbance. The patient is not nervous/anxious.        Objective   /78 (BP Location: Left arm, Patient Position: Sitting, Cuff Size: Standard)   Pulse 72   Resp 16   Ht 5' 9\" (1.753 m)   Wt 81.6 kg (180 lb)   SpO2 98%   BMI 26.58 kg/m²      Physical Exam  Vitals and nursing note reviewed.   Constitutional:       General: He is not in acute distress.     Appearance: Normal appearance. He is well-developed. He is not ill-appearing.   HENT:      Head: Normocephalic and atraumatic.      Right Ear: Tympanic membrane, ear canal and external ear normal.      Left Ear: Tympanic membrane, ear canal and external ear normal.      Nose: Nose normal. No congestion or rhinorrhea.      Mouth/Throat:      Mouth: Mucous membranes are moist.      Pharynx: Oropharynx is clear. No posterior oropharyngeal erythema.   Eyes:      General:         Right eye: No discharge.      Conjunctiva/sclera: Conjunctivae normal.      Pupils: Pupils are equal, round, and reactive to light.   Neck:      Thyroid: No thyromegaly.   Cardiovascular:      Rate and Rhythm: Normal rate and regular rhythm.      Heart sounds: No murmur heard.  Pulmonary:      Effort: Pulmonary effort is normal. No respiratory distress.      Breath sounds: Normal breath sounds. No wheezing.   Abdominal:      General: Bowel sounds are normal.      Palpations: Abdomen is soft. There is no mass.      " Tenderness: There is no abdominal tenderness.   Musculoskeletal:         General: No swelling or deformity. Normal range of motion.      Cervical back: Normal range of motion and neck supple. No tenderness.      Right lower leg: No edema.      Left lower leg: No edema.   Lymphadenopathy:      Cervical: No cervical adenopathy.   Skin:     General: Skin is warm and dry.      Capillary Refill: Capillary refill takes less than 2 seconds.      Findings: No erythema or rash.   Neurological:      General: No focal deficit present.      Mental Status: He is alert and oriented to person, place, and time.      Sensory: No sensory deficit.   Psychiatric:         Mood and Affect: Mood normal.         Behavior: Behavior normal.         Thought Content: Thought content normal.         Judgment: Judgment normal.

## 2025-01-15 NOTE — PATIENT INSTRUCTIONS
"Patient Education     Routine physical for adults   The Basics   Written by the doctors and editors at [facility]   What is a physical? -- A physical is a routine visit, or \"check-up,\" with your doctor. You might also hear it called a \"wellness visit\" or \"preventive visit.\"  During each visit, the doctor will:   Ask about your physical and mental health   Ask about your habits, behaviors, and lifestyle   Do an exam   Give you vaccines if needed   Talk to you about any medicines you take   Give advice about your health   Answer your questions  Getting regular check-ups is an important part of taking care of your health. It can help your doctor find and treat any problems you have. But it's also important for preventing health problems.  A routine physical is different from a \"sick visit.\" A sick visit is when you see a doctor because of a health concern or problem. Since physicals are scheduled ahead of time, you can think about what you want to ask the doctor.  How often should I get a physical? -- It depends on your age and health. In general, for people age 21 years and older:   If you are younger than 50 years, you might be able to get a physical every 3 years.   If you are 50 years or older, your doctor might recommend a physical every year.  If you have an ongoing health condition, like diabetes or high blood pressure, your doctor will probably want to see you more often.  What happens during a physical? -- In general, each visit will include:   Physical exam - The doctor or nurse will check your height, weight, heart rate, and blood pressure. They will also look at your eyes and ears. They will ask about how you are feeling and whether you have any symptoms that bother you.   Medicines - It's a good idea to bring a list of all the medicines you take to each doctor visit. Your doctor will talk to you about your medicines and answer any questions. Tell them if you are having any side effects that bother you. You " "should also tell them if you are having trouble paying for any of your medicines.   Habits and behaviors - This includes:   Your diet   Your exercise habits   Whether you smoke, drink alcohol, or use drugs   Whether you are sexually active   Whether you feel safe at home  Your doctor will talk to you about things you can do to improve your health and lower your risk of health problems. They will also offer help and support. For example, if you want to quit smoking, they can give you advice and might prescribe medicines. If you want to improve your diet or get more physical activity, they can help you with this, too.   Lab tests, if needed - The tests you get will depend on your age and situation. For example, your doctor might want to check your:   Cholesterol   Blood sugar   Iron level   Vaccines - The recommended vaccines will depend on your age, health, and what vaccines you already had. Vaccines are very important because they can prevent certain serious or deadly infections.   Discussion of screening - \"Screening\" means checking for diseases or other health problems before they cause symptoms. Your doctor can recommend screening based on your age, risk, and preferences. This might include tests to check for:   Cancer, such as breast, prostate, cervical, ovarian, colorectal, prostate, lung, or skin cancer   Sexually transmitted infections, such as chlamydia and gonorrhea   Mental health conditions like depression and anxiety  Your doctor will talk to you about the different types of screening tests. They can help you decide which screenings to have. They can also explain what the results might mean.   Answering questions - The physical is a good time to ask the doctor or nurse questions about your health. If needed, they can refer you to other doctors or specialists, too.  Adults older than 65 years often need other care, too. As you get older, your doctor will talk to you about:   How to prevent falling at " home   Hearing or vision tests   Memory testing   How to take your medicines safely   Making sure that you have the help and support you need at home  All topics are updated as new evidence becomes available and our peer review process is complete.  This topic retrieved from [employer] on: May 02, 2024.  Topic 379046 Version 1.0  Release: 32.4.3 - C32.122  © 2024 UpToDate, Inc. and/or its affiliates. All rights reserved.  Consumer Information Use and Disclaimer   Disclaimer: This generalized information is a limited summary of diagnosis, treatment, and/or medication information. It is not meant to be comprehensive and should be used as a tool to help the user understand and/or assess potential diagnostic and treatment options. It does NOT include all information about conditions, treatments, medications, side effects, or risks that may apply to a specific patient. It is not intended to be medical advice or a substitute for the medical advice, diagnosis, or treatment of a health care provider based on the health care provider's examination and assessment of a patient's specific and unique circumstances. Patients must speak with a health care provider for complete information about their health, medical questions, and treatment options, including any risks or benefits regarding use of medications. This information does not endorse any treatments or medications as safe, effective, or approved for treating a specific patient. UpToDate, Inc. and its affiliates disclaim any warranty or liability relating to this information or the use thereof.The use of this information is governed by the Terms of Use, available at https://www.woltersFridgeuwer.com/en/know/clinical-effectiveness-terms. 2024© UpToDate, Inc. and its affiliates and/or licensors. All rights reserved.  Copyright   © 2024 UpToDate, Inc. and/or its affiliates. All rights reserved.

## 2025-01-15 NOTE — ASSESSMENT & PLAN NOTE
CPE done. Last Tdap was in 2014. Declined flu shot. Recommend Gardasil series. Will check labs. Pt advised to follow a well balanced, heart healthy diet and to exercise on a regular basis. Not a smoker. Blood pressure good.   Orders:  •  Lipid panel; Future  •  Comprehensive metabolic panel; Future  •  CBC and differential; Future

## 2025-01-16 ENCOUNTER — OFFICE VISIT (OUTPATIENT)
Dept: OTOLARYNGOLOGY | Facility: CLINIC | Age: 35
End: 2025-01-16
Payer: COMMERCIAL

## 2025-01-16 VITALS — TEMPERATURE: 97.5 F | HEIGHT: 69 IN | BODY MASS INDEX: 26.66 KG/M2 | WEIGHT: 180 LBS

## 2025-01-16 DIAGNOSIS — R09.A2 GLOBUS SENSATION: ICD-10-CM

## 2025-01-16 DIAGNOSIS — K21.9 LARYNGOPHARYNGEAL REFLUX (LPR): Primary | ICD-10-CM

## 2025-01-16 DIAGNOSIS — H61.23 BILATERAL IMPACTED CERUMEN: ICD-10-CM

## 2025-01-16 PROCEDURE — 69210 REMOVE IMPACTED EAR WAX UNI: CPT | Performed by: OTOLARYNGOLOGY

## 2025-01-16 PROCEDURE — 99204 OFFICE O/P NEW MOD 45 MIN: CPT | Performed by: OTOLARYNGOLOGY

## 2025-01-16 PROCEDURE — 31575 DIAGNOSTIC LARYNGOSCOPY: CPT | Performed by: OTOLARYNGOLOGY

## 2025-01-16 NOTE — PROGRESS NOTES
"Assessment/Plan:  Symptoms most likely due to KAITLYNN/LPR.  Dietary recommendations reviewed.  Consider a PPI.  Pt has been under a lot of work stress lately.  Impacted cerumen removed.  F/u in 2 months.      Diagnosis ICD-10-CM Associated Orders   1. Laryngopharyngeal reflux (LPR)  K21.9       2. Globus sensation  R09.A2 Ambulatory Referral to Otolaryngology      3. Bilateral impacted cerumen  H61.23              Subjective:      Patient ID: Cliff Ashton is a 34 y.o. male.    2 months history of globus sensation.  It goes away when he is eating or drinking.        The following portions of the patient's history were reviewed and updated as appropriate: allergies, current medications, past family history, past medical history, past social history, past surgical history and problem list.    Review of Systems      Objective:      Temp 97.5 °F (36.4 °C) (Temporal)   Ht 5' 9\" (1.753 m)   Wt 81.6 kg (180 lb)   BMI 26.58 kg/m²          Physical Exam  Constitutional:       Appearance: He is well-developed.   HENT:      Head: Normocephalic and atraumatic.      Right Ear: Tympanic membrane, ear canal and external ear normal. No drainage. No middle ear effusion. There is impacted cerumen.      Left Ear: Tympanic membrane, ear canal and external ear normal. No drainage.  No middle ear effusion. There is impacted cerumen.      Nose: Nose normal.      Mouth/Throat:      Pharynx: Uvula midline. No oropharyngeal exudate.      Tonsils: 2+ on the right. 2+ on the left.   Neck:      Thyroid: No thyroid mass or thyromegaly.      Trachea: Trachea normal. No tracheal deviation.   Lymphadenopathy:      Cervical: No cervical adenopathy.   Neurological:      Mental Status: He is alert.         PROCEDURE: Cerumen removal from ears  Impacted cerumen removal was performed by Dr. Silva and removed from both ears.   The visualization instrument used was the operating otoscope and speculum. The ear cleaning instrument used was alligator " forceps. The outcome was successful and the patient tolerated the procedure well. There were no complications.    Flexible Fiberoptic Nasolaryngoscopy Procedure Note:  Indication:  globus sensation  Verbal consent obtained.  Surgeon: Ernesto Silva MD  Anesthesia: 4% lidocaine, oxymetazoline  Scope passed through nasal cavities bilaterally.  Right Nasal Cavity:  Mucosa: normal  Secretions: clear  Left Nasal Cavity:  Mucosa: normal  Secretions: clear  Nasopharynx: unremarkable  Oropharynx: unremarkable  Hypopharynx/Larynx:   Vocal fold mobility = nl   Laryngeal edema  = mild PCE   Laryngeal erythema = none   Vocal folds = nl   Valleculae= clear   Piriform Sinuses= clear  Other findings = none  Patient tolerated procedure well without complications

## 2025-01-18 LAB
ALBUMIN SERPL-MCNC: 4.6 G/DL (ref 3.6–5.1)
ALBUMIN/GLOB SERPL: 1.8 (CALC) (ref 1–2.5)
ALP SERPL-CCNC: 26 U/L (ref 36–130)
ALT SERPL-CCNC: 23 U/L (ref 9–46)
AST SERPL-CCNC: 17 U/L (ref 10–40)
BASOPHILS # BLD AUTO: 41 CELLS/UL (ref 0–200)
BASOPHILS NFR BLD AUTO: 0.8 %
BILIRUB SERPL-MCNC: 1 MG/DL (ref 0.2–1.2)
BUN SERPL-MCNC: 17 MG/DL (ref 7–25)
BUN/CREAT SERPL: ABNORMAL (CALC) (ref 6–22)
CALCIUM SERPL-MCNC: 9.8 MG/DL (ref 8.6–10.3)
CHLORIDE SERPL-SCNC: 100 MMOL/L (ref 98–110)
CHOLEST SERPL-MCNC: 159 MG/DL
CHOLEST/HDLC SERPL: 2.8 (CALC)
CO2 SERPL-SCNC: 31 MMOL/L (ref 20–32)
CREAT SERPL-MCNC: 0.85 MG/DL (ref 0.6–1.26)
EOSINOPHIL # BLD AUTO: 168 CELLS/UL (ref 15–500)
EOSINOPHIL NFR BLD AUTO: 3.3 %
ERYTHROCYTE [DISTWIDTH] IN BLOOD BY AUTOMATED COUNT: 11.7 % (ref 11–15)
GFR/BSA.PRED SERPLBLD CYS-BASED-ARV: 115 ML/MIN/1.73M2
GLOBULIN SER CALC-MCNC: 2.5 G/DL (CALC) (ref 1.9–3.7)
GLUCOSE SERPL-MCNC: 86 MG/DL (ref 65–99)
HCT VFR BLD AUTO: 48.3 % (ref 38.5–50)
HDLC SERPL-MCNC: 56 MG/DL
HGB BLD-MCNC: 16.5 G/DL (ref 13.2–17.1)
LDLC SERPL CALC-MCNC: 87 MG/DL (CALC)
LYMPHOCYTES # BLD AUTO: 1591 CELLS/UL (ref 850–3900)
LYMPHOCYTES NFR BLD AUTO: 31.2 %
MCH RBC QN AUTO: 31.4 PG (ref 27–33)
MCHC RBC AUTO-ENTMCNC: 34.2 G/DL (ref 32–36)
MCV RBC AUTO: 92 FL (ref 80–100)
MONOCYTES # BLD AUTO: 428 CELLS/UL (ref 200–950)
MONOCYTES NFR BLD AUTO: 8.4 %
NEUTROPHILS # BLD AUTO: 2871 CELLS/UL (ref 1500–7800)
NEUTROPHILS NFR BLD AUTO: 56.3 %
NONHDLC SERPL-MCNC: 103 MG/DL (CALC)
PLATELET # BLD AUTO: 221 THOUSAND/UL (ref 140–400)
PMV BLD REES-ECKER: 10.4 FL (ref 7.5–12.5)
POTASSIUM SERPL-SCNC: 3.9 MMOL/L (ref 3.5–5.3)
PROT SERPL-MCNC: 7.1 G/DL (ref 6.1–8.1)
RBC # BLD AUTO: 5.25 MILLION/UL (ref 4.2–5.8)
SODIUM SERPL-SCNC: 139 MMOL/L (ref 135–146)
TRIGL SERPL-MCNC: 71 MG/DL
WBC # BLD AUTO: 5.1 THOUSAND/UL (ref 3.8–10.8)

## 2025-05-08 ENCOUNTER — OFFICE VISIT (OUTPATIENT)
Dept: FAMILY MEDICINE CLINIC | Facility: CLINIC | Age: 35
End: 2025-05-08
Payer: COMMERCIAL

## 2025-05-08 ENCOUNTER — TELEPHONE (OUTPATIENT)
Age: 35
End: 2025-05-08

## 2025-05-08 VITALS
OXYGEN SATURATION: 95 % | RESPIRATION RATE: 16 BRPM | HEIGHT: 69 IN | WEIGHT: 176 LBS | SYSTOLIC BLOOD PRESSURE: 110 MMHG | HEART RATE: 84 BPM | DIASTOLIC BLOOD PRESSURE: 80 MMHG | BODY MASS INDEX: 26.07 KG/M2

## 2025-05-08 DIAGNOSIS — A04.8 H. PYLORI INFECTION: ICD-10-CM

## 2025-05-08 DIAGNOSIS — K21.9 LARYNGOPHARYNGEAL REFLUX (LPR): Primary | ICD-10-CM

## 2025-05-08 PROCEDURE — 99213 OFFICE O/P EST LOW 20 MIN: CPT | Performed by: FAMILY MEDICINE

## 2025-05-08 RX ORDER — SACCHAROMYCES BOULARDII 250 MG
250 CAPSULE ORAL 2 TIMES DAILY
Qty: 60 CAPSULE | Refills: 0 | Status: SHIPPED | OUTPATIENT
Start: 2025-05-08

## 2025-05-08 RX ORDER — LANSOPRAZOLE, AMOXICILLIN, CLARITHROMYCIN 30-500-500
4 KIT ORAL 2 TIMES DAILY
Qty: 112 EACH | Refills: 0 | Status: SHIPPED | OUTPATIENT
Start: 2025-05-08 | End: 2025-05-22

## 2025-05-08 RX ORDER — LANSOPRAZOLE, AMOXICILLIN, CLARITHROMYCIN 30-500-500
1 KIT ORAL 2 TIMES DAILY
Qty: 28 EACH | Refills: 0 | Status: SHIPPED | OUTPATIENT
Start: 2025-05-08 | End: 2025-05-08 | Stop reason: SDUPTHER

## 2025-05-08 RX ORDER — BACILLUS COAGULANS/INULIN 1B-250 MG
CAPSULE ORAL
COMMUNITY

## 2025-05-08 RX ORDER — TETRACYCLINE HCL 500 MG
CAPSULE ORAL
COMMUNITY

## 2025-05-08 RX ORDER — CHOLECALCIFEROL (VITAMIN D3) 10(400)/ML
DROPS ORAL
COMMUNITY
Start: 2025-02-21 | End: 2025-05-08

## 2025-05-08 NOTE — ASSESSMENT & PLAN NOTE
Patient has had symptoms for months and recent stool test was positive for H pylori. Will treat with Prevpak for 2 weeks and probiotic. If symptoms persist, will refer to Gastroenterology for further evaluation and management.   Orders:  •  Zwzoilcdj-Gibngqsmt-Uxolbrmqp 500 & 500 & 30 MG THPK; Take 1 packet by mouth 2 (two) times a day for 14 days  •  saccharomyces boulardii (FLORASTOR) 250 mg capsule; Take 1 capsule (250 mg total) by mouth 2 (two) times a day

## 2025-05-08 NOTE — TELEPHONE ENCOUNTER
Pharmacist called and stated the Skniqafow-Nnbhtpwag-Hnawfguzm 500 & 500 & 30 MG THPK  comes with a quality of 112 each.Please submit new script to pharmacy.

## 2025-05-08 NOTE — ASSESSMENT & PLAN NOTE
Patient saw ENT in February 2025 and thinks that this is causing patient's globus sensation. Patient to follow GERD diet. Not on medication at present.

## 2025-05-08 NOTE — PROGRESS NOTES
"Name: Cliff Ashton      : 1990      MRN: [id number]  Encounter Provider: Tin Charles MD  Encounter Date: 2025   Encounter department: [facility]  :  Assessment & Plan  Laryngopharyngeal reflux (LPR)  Patient saw ENT in 2025 and thinks that this is causing patient's globus sensation. Patient to follow GERD diet. Not on medication at present.        H. pylori infection  Patient has had symptoms for months and recent stool test was positive for H pylori. Will treat with Prevpak for 2 weeks and probiotic. If symptoms persist, will refer to Gastroenterology for further evaluation and management.   Orders:  •  Gqjpdypza-Jdirqmwoq-Bxuhpqqrx 500 & 500 & 30 MG THPK; Take 1 packet by mouth 2 (two) times a day for 14 days  •  saccharomyces boulardii (FLORASTOR) 250 mg capsule; Take 1 capsule (250 mg total) by mouth 2 (two) times a day           History of Present Illness   Patient here for follow-up LPR. Patient saw ENT for globus sensation and had scope done. Patient told to follow GERD diet and to take PPI. Patient has changed diet and feels better. Saw Nutritionist and had stool test done. Patient tested positive for H pylori and needs treatment. Patient gets burping and burning in chest at times.       Review of Systems   Constitutional:  Negative for fatigue and unexpected weight change.   Respiratory:  Negative for cough and shortness of breath.    Cardiovascular:  Negative for chest pain.   Gastrointestinal:  Positive for abdominal pain. Negative for constipation, diarrhea and vomiting.        Burping and burning   Musculoskeletal:  Negative for arthralgias.   Neurological:  Negative for dizziness and headaches.   Psychiatric/Behavioral:  Negative for dysphoric mood. The patient is not nervous/anxious.        Objective   /80 (BP Location: Left arm, Patient Position: Sitting, Cuff Size: Standard)   Pulse 84   Resp 16   Ht 5' 9\" (1.753 m)   Wt 79.8 kg (176 " lb)   SpO2 95%   BMI 25.99 kg/m²      Physical Exam  Vitals and nursing note reviewed.   Constitutional:       Appearance: Normal appearance. He is normal weight.   Neck:      Vascular: No carotid bruit.   Cardiovascular:      Rate and Rhythm: Normal rate and regular rhythm.      Heart sounds: Normal heart sounds. No murmur heard.  Pulmonary:      Effort: Pulmonary effort is normal.      Breath sounds: Normal breath sounds. No wheezing.   Abdominal:      General: Abdomen is flat.      Palpations: Abdomen is soft.      Tenderness: There is abdominal tenderness (epigastric).   Musculoskeletal:      Cervical back: Normal range of motion and neck supple. No muscular tenderness.      Right lower leg: No edema.      Left lower leg: No edema.   Lymphadenopathy:      Cervical: No cervical adenopathy.   Neurological:      Mental Status: He is alert.   Psychiatric:         Mood and Affect: Mood normal.         Behavior: Behavior normal.         Thought Content: Thought content normal.         Judgment: Judgment normal.

## 2025-05-09 DIAGNOSIS — A04.8 H. PYLORI INFECTION: Primary | ICD-10-CM

## 2025-05-09 RX ORDER — LANSOPRAZOLE 30 MG/1
30 CAPSULE, DELAYED RELEASE ORAL 2 TIMES DAILY
Qty: 28 CAPSULE | Refills: 0 | Status: SHIPPED | OUTPATIENT
Start: 2025-05-09 | End: 2025-05-23

## 2025-05-09 RX ORDER — AMOXICILLIN 500 MG/1
1000 TABLET, FILM COATED ORAL 2 TIMES DAILY
Qty: 56 TABLET | Refills: 0 | Status: SHIPPED | OUTPATIENT
Start: 2025-05-09 | End: 2025-05-23

## 2025-05-09 RX ORDER — CLARITHROMYCIN 500 MG/1
500 TABLET ORAL 2 TIMES DAILY
Qty: 28 TABLET | Refills: 0 | Status: SHIPPED | OUTPATIENT
Start: 2025-05-09 | End: 2025-05-23

## 2025-05-09 NOTE — TELEPHONE ENCOUNTER
Pharmacist called, Hucmqyimo-Gyclbofvu-Oauwmeumc 500 & 500 & 30 MG THPK  is unavailable, he suggested sending 3 medications separately and include in directions to be taken together.

## 2025-06-22 ENCOUNTER — OFFICE VISIT (OUTPATIENT)
Dept: URGENT CARE | Facility: CLINIC | Age: 35
End: 2025-06-22
Payer: COMMERCIAL

## 2025-06-22 ENCOUNTER — APPOINTMENT (OUTPATIENT)
Dept: RADIOLOGY | Facility: CLINIC | Age: 35
End: 2025-06-22
Payer: COMMERCIAL

## 2025-06-22 VITALS
DIASTOLIC BLOOD PRESSURE: 89 MMHG | OXYGEN SATURATION: 98 % | RESPIRATION RATE: 16 BRPM | BODY MASS INDEX: 26.07 KG/M2 | TEMPERATURE: 97.5 F | SYSTOLIC BLOOD PRESSURE: 139 MMHG | HEART RATE: 62 BPM | WEIGHT: 176 LBS | HEIGHT: 69 IN

## 2025-06-22 DIAGNOSIS — R10.84 GENERALIZED ABDOMINAL PAIN: ICD-10-CM

## 2025-06-22 DIAGNOSIS — R10.84 GENERALIZED ABDOMINAL PAIN: Primary | ICD-10-CM

## 2025-06-22 PROCEDURE — G0382 LEV 3 HOSP TYPE B ED VISIT: HCPCS | Performed by: FAMILY MEDICINE

## 2025-06-22 PROCEDURE — 74022 RADEX COMPL AQT ABD SERIES: CPT

## 2025-06-22 PROCEDURE — S9083 URGENT CARE CENTER GLOBAL: HCPCS | Performed by: FAMILY MEDICINE

## 2025-06-22 NOTE — PROGRESS NOTES
[facility]        NAME: Cliff Ashton is a 35 y.o. male  : 1990    MRN: [id number]  DATE: 2025  TIME: 8:07 PM    Assessment and Plan   Generalized abdominal pain [R10.84]  1. Generalized abdominal pain  XR abdomen obstruction series    Ambulatory Referral to Gastroenterology        Patient Instructions     Patient Instructions   Referral provided to GI for further evaluation and treatment. Discussed possibility for possible gastric ulcer with recent history H. Pylori infection.  Patient has been advised to closely monitor his symptoms, follow up w/ PCP office for re-check this week.   If symptoms acutely worsen or any new symptoms present, patient is to be seen in the ER.     Follow up with PCP in 3-5 days.  Proceed to  ER if symptoms worsen.    If tests have been performed at [facility], our office will contact you with results if changes need to be made to the care plan discussed with you at the visit.  [facility]    Chief Complaint     Chief Complaint   Patient presents with    Abdominal Pain     Abdominal pain x 5 days.      History of Present Illness     36 yo male presents c/o intermittent episodes of epigastric abd pain that have been ongoing for the past 5 days. The pain does not radiate. Patient states the symptoms last 1-2 hours and resolve spontaneously. Patient denies any injury to the abdominal wall. No distention/swelling or discoloration of the abdomen. Patient states he had associated gas and bloating symptoms and states he has felt some mild nausea with the episodes as well. No diarrhea. He states he has some difficulty defecating and has felt a little constipated therefore he has taken colace x 2 doses. He denies any blood in the stool, however states he was recently on treatment with multiple antibiotics last month for treatment of H. Pylori and states he did have blood in his stool at that time. He has a stable appetite and  "is tolerating oral in take. No pelvic pain or  symptoms. No flank pain or UTI symptoms. No fever/chills. No chest pain or SOB. No recent travel or known sick contacts. Patient has no known allergies. No new foods or recent change in diet. Patient states he was treated for the H. Pylori by a holistic doctor and he is not established a GI care provider.      Review of Systems   Review of Systems   Constitutional: Negative.    Respiratory: Negative.     Cardiovascular: Negative.    Gastrointestinal:  Positive for abdominal pain.        As noted in HPI   Genitourinary: Negative.    Musculoskeletal: Negative.    Skin: Negative.    Allergic/Immunologic: Negative.    Neurological: Negative.    Hematological: Negative.      Current Medications     Current Medications[1]    Current Allergies     Allergies as of 06/22/2025 - Reviewed 06/22/2025   Allergen Reaction Noted    Cat dander Allergic Rhinitis 04/10/2024            The following portions of the patient's history were reviewed and updated as appropriate: allergies, current medications, past family history, past medical history, past social history, past surgical history and problem list.     Past Medical History[2]    Past Surgical History[3]    Family History[4]      Medications have been verified.        Objective   /89   Pulse 62   Temp 97.5 °F (36.4 °C)   Resp 16   Ht 5' 9\" (1.753 m)   Wt 79.8 kg (176 lb)   SpO2 98%   BMI 25.99 kg/m²   No LMP for male patient.       Physical Exam     Physical Exam  Vitals and nursing note reviewed.   Constitutional:       General: He is awake. He is not in acute distress.     Appearance: Normal appearance. He is well-developed and well-groomed. He is not ill-appearing, toxic-appearing or diaphoretic.     Cardiovascular:      Rate and Rhythm: Normal rate and regular rhythm.      Pulses: Normal pulses.      Heart sounds: Normal heart sounds.   Pulmonary:      Effort: Pulmonary effort is normal. No tachypnea, accessory " muscle usage or respiratory distress.      Breath sounds: Normal breath sounds and air entry.   Abdominal:      General: Abdomen is flat. Bowel sounds are normal. There is no distension or abdominal bruit. There are no signs of injury.      Palpations: Abdomen is soft. There is no shifting dullness, fluid wave, hepatomegaly, splenomegaly, mass or pulsatile mass.      Tenderness: There is abdominal tenderness. There is no right CVA tenderness, left CVA tenderness, guarding or rebound.      Hernia: No hernia is present.      Comments: There is very mild tenderness to palpation in the epigastric area.     Skin:     General: Skin is warm and dry.      Capillary Refill: Capillary refill takes less than 2 seconds.      Coloration: Skin is not pale.     Neurological:      Mental Status: He is alert and oriented to person, place, and time. Mental status is at baseline.     Psychiatric:         Mood and Affect: Mood normal.         Behavior: Behavior normal. Behavior is cooperative.         Thought Content: Thought content normal.         Judgment: Judgment normal.                        [1]   Current Outpatient Medications:     Apple Cider Vinegar 500 MG TABS, , Disp: , Rfl:     Bacillus Coagulans-Inulin (Probiotic) 1-250 BILLION-MG CAPS, , Disp: , Rfl:     Digestive Enzymes (Betaine HCl) 650-130 MG CAPS, Take 3 capsules by mouth 3 (three) times daily after meals, Disp: , Rfl:     lansoprazole (PREVACID) 30 mg capsule, Take 1 capsule (30 mg total) by mouth 2 (two) times a day for 14 days, Disp: 28 capsule, Rfl: 0    Magnesium 100 MG CAPS, , Disp: , Rfl:     saccharomyces boulardii (FLORASTOR) 250 mg capsule, Take 1 capsule (250 mg total) by mouth 2 (two) times a day, Disp: 60 capsule, Rfl: 0  [2]   Past Medical History:  Diagnosis Date    Anxiety     Concussion     Seizures (HCC)     Between ages 2-5 and once in 2014 after a concussion    SVT (supraventricular tachycardia) (HCC) [id number]   [3]   Past Surgical  History:  Procedure Laterality Date    HERNIA REPAIR      bilateral inguinal hernias    WISDOM TOOTH EXTRACTION     [4]   Family History  Problem Relation Name Age of Onset    Heart Valve Disease Mother      Hypertension Father      No Known Problems Brother      No Known Problems Brother      No Known Problems Son      No Known Problems Daughter      Cancer Maternal Grandmother Christina     Heart disease Maternal Grandfather      Heart attack Maternal Grandfather      Breast cancer Paternal Grandmother      Heart disease Paternal Grandfather      Heart attack Paternal Grandfather

## 2025-06-22 NOTE — PATIENT INSTRUCTIONS
Referral provided to GI for further evaluation and treatment. Discussed possibility for possible gastric ulcer with recent history H. Pylori infection.  Patient has been advised to closely monitor his symptoms, follow up w/ PCP office for re-check this week.   If symptoms acutely worsen or any new symptoms present, patient is to be seen in the ER.

## 2025-06-26 ENCOUNTER — TELEPHONE (OUTPATIENT)
Dept: GASTROENTEROLOGY | Facility: CLINIC | Age: 35
End: 2025-06-26

## 2025-06-27 ENCOUNTER — OFFICE VISIT (OUTPATIENT)
Dept: GASTROENTEROLOGY | Facility: CLINIC | Age: 35
End: 2025-06-27
Payer: COMMERCIAL

## 2025-06-27 VITALS
BODY MASS INDEX: 26.66 KG/M2 | TEMPERATURE: 97 F | HEIGHT: 69 IN | SYSTOLIC BLOOD PRESSURE: 120 MMHG | WEIGHT: 180 LBS | DIASTOLIC BLOOD PRESSURE: 70 MMHG

## 2025-06-27 DIAGNOSIS — Z86.19 HISTORY OF HELICOBACTER PYLORI INFECTION: ICD-10-CM

## 2025-06-27 DIAGNOSIS — R10.84 GENERALIZED ABDOMINAL PAIN: Primary | ICD-10-CM

## 2025-06-27 DIAGNOSIS — R19.4 CHANGE IN BOWEL HABIT: ICD-10-CM

## 2025-06-27 DIAGNOSIS — Z86.19 HISTORY OF INTESTINAL PARASITE: ICD-10-CM

## 2025-06-27 DIAGNOSIS — K21.9 GASTROESOPHAGEAL REFLUX DISEASE, UNSPECIFIED WHETHER ESOPHAGITIS PRESENT: ICD-10-CM

## 2025-06-27 PROCEDURE — 99244 OFF/OP CNSLTJ NEW/EST MOD 40: CPT | Performed by: PHYSICIAN ASSISTANT

## 2025-06-27 NOTE — PATIENT INSTRUCTIONS
Can take famotidine (pepcid) 20 mg up to twice daily as needed.     Can also trial omeprazole (Prilosec) 20 mg once daily before breakfast for reflux x 2 months- if symptoms persist or are not better call       Avoid Elly Baker Ibuprofen (NSAIDS)    Drink at least 64 oz water/ day

## 2025-06-27 NOTE — PROGRESS NOTES
Name: Cliff Ashton      : 1990      MRN: [id number]  Encounter Provider: Arlette Rodriguez PA-C  Encounter Date: 2025   Encounter department: [facility] [place]  :  Assessment & Plan  Generalized abdominal pain  Patient is feeling much better at today's visit without any acute complaints today or alarming symptoms.  He has good bowel sounds and is abdomen is nontender.  He has no family history of CRC or IBD.    Explained to the patient that I recommend he undergoes repeat stool testing to ensure eradication of H. pylori bacteria.  Will also order ova and parasite testing for completeness.  I suspect that his recent bout of constipation and abdominal pain was secondary to dehydration but for completeness we will check an updated CBC, CMP, TSH, CRP, celiac panel  Given lack of alarming symptoms, no plans for EGD or colonoscopy at this time but explained we can certainly consider this in ongoing follow-up  I encouraged the patient to generally avoid NSAIDs and to drink at least 64 ounces of water a day.  I also encouraged the patient to eat a diet high in fiber including plenty of fruits, veggies, seeds, nuts.  I did explain that I generally recommend against patients taking probiotics as the data on probiotics is very limited.  He expressed understanding of this.    Patient will monitor her symptoms and contact our office with any questions or concerns.  All questions answered.  Patient appreciative of care.  Orders:    Ambulatory Referral to Gastroenterology    IgA; Future    Tissue Transglutaminase, IgA; Future    CBC; Future    Comprehensive metabolic panel; Future    TSH, 3rd generation with Free T4 reflex; Future    C-reactive protein; Future    Change in bowel habit  As above.   Orders:    IgA; Future    Tissue Transglutaminase, IgA; Future    CBC; Future    Comprehensive metabolic panel; Future    TSH, 3rd generation with Free T4 reflex; Future     C-reactive protein; Future    History of Helicobacter pylori infection  As above.   Orders:    H. pylori antigen, stool; Future    History of intestinal parasite  As above.   Orders:    Ova and parasite examination; Future    Gastroesophageal reflux disease, unspecified whether esophagitis present  Patient reports history of LPR.  I provided education on both GERD and LPR.  I educated patient and recommended  GERD diet and lifestyle including avoidance of trigger foods including fatty, greasy, spicy foods, chocolate, caffeine, alcohol, citrus foods, tomato sauces. We discussed  the importance of eating smaller frequent meals, not eating close to bedtime. I also advised to limit NSAIDs if able.  Handout on GERD and GERD diet was provided in the office today.  We discussed that if his symptoms return he could try using famotidine (Pepcid) 20 mg tablets up to twice daily over-the-counter as needed and see if this helps.  We also discussed that he could try omeprazole(Prilosec) over-the-counter for a few weeks/months and see if this helps then stop.  Discussed with the patient that if he is unable to stop medication like Pepcid or Prilosec or if his symptoms persist he should call our office at which time would plan to schedule for EGD.  Patient expressed understanding to this.  All questions answered.  Patient appreciative of care.           Patient was instructed to call the office with any questions, concerns, new/ worsening/ persisting GI symptoms. Advised patient go to the ER with any severe or worsening abdominal pain, fevers/ chills, intractable N/V, chest pain, SOB, dizziness, lightheadedness, feeling something stuck in esophagus that will not go down. Patient expressed understanding and is in agreement with treatment plan.     Will plan to follow up in 6 months      History of Present Illness     Cliff Ashton is a 35 y.o. male with a past medical history of anxiety, LPR who presents to the office today as a  referral to discuss abdominal pain.    Patient presents with daughter Blake today.     Patient tells me in December he started to have globus sensation.   He saw ENT who told the patient that he had likely LPR and to start PPI.  Patient took this for a few weeks but then stopped as he wanted to manage his symptoms through diet and lifestyle.  He went on to see a holistic doctor who ordered stool testing which showed an H. pylori infection as well as a parasite infection.  I do not have these results to review at the time of visit today.  He saw his PCP following the stool testing who prescribed H. pylori antibiotics and probiotic.  Patient states that after he took the antibiotics his symptoms of LPR significantly improved.  Around this time he was also taking probiotics, digestive enzymes.  He continues to take 1 probiotic daily and he also tries to eat lots of foods containing natural probiotics.  Patient states after H. pylori treatment he was feeling well until about a week or so ago.  He was exercising a lot training for [employer] race and likely not drinking enough water.  He developed significant constipation, generalized abdominal pain, nausea leading him to an urgent care.  Urgent care visit reviewed from 6/22/2025.  In the urgent care he did undergo abdominal x-ray  which showed moderate fecal retention in the colon.  He then went on to have several bowel movements.  His symptoms have since completely resolved.  He is feeling much better at this time.  Denies chronic diarrhea.  No unintentional weight loss.  No nausea or vomiting.  No difficulty swallowing.  No blood in the stool.    Tobacco use- None  Alcohol use- None  NSAID use- None    Patient denies first-degree relative with colon cancer, inflammatory bowel disease, celiac disease     No prior EGD or colonoscopy     Review of Systems   Constitutional:  Negative for chills and fever.   HENT:  Negative for ear pain and sore throat.    Eyes:  Negative  "for pain and visual disturbance.   Respiratory:  Negative for cough and shortness of breath.    Cardiovascular:  Negative for chest pain and palpitations.   Gastrointestinal:  Negative for blood in stool and vomiting.   Genitourinary:  Negative for dysuria and hematuria.   Musculoskeletal:  Negative for arthralgias and back pain.   Skin:  Negative for color change and rash.   Neurological:  Negative for seizures and syncope.   All other systems reviewed and are negative.      Objective   /70 (BP Location: Right arm, Patient Position: Sitting, Cuff Size: Adult)   Temp (!) 97 °F (36.1 °C) (Tympanic)   Ht 5' 9\" (1.753 m)   Wt 81.6 kg (180 lb)   BMI 26.58 kg/m²      Physical Exam  Vitals reviewed.   Constitutional:       General: He is not in acute distress.     Appearance: He is well-developed. He is not ill-appearing or diaphoretic.   HENT:      Head: Normocephalic and atraumatic.     Eyes:      Conjunctiva/sclera: Conjunctivae normal.       Cardiovascular:      Rate and Rhythm: Normal rate and regular rhythm.      Heart sounds: No murmur heard.  Pulmonary:      Effort: Pulmonary effort is normal. No respiratory distress.      Breath sounds: Normal breath sounds.   Abdominal:      General: Bowel sounds are normal.      Palpations: Abdomen is soft.      Tenderness: There is no abdominal tenderness.     Musculoskeletal:         General: No swelling.      Cervical back: Neck supple.     Skin:     General: Skin is warm and dry.      Capillary Refill: Capillary refill takes less than 2 seconds.     Neurological:      General: No focal deficit present.      Mental Status: He is alert and oriented to person, place, and time.     Psychiatric:         Mood and Affect: Mood normal.         Behavior: Behavior normal.       Lab Results   Component Value Date    WBC 5.1 01/17/2025    HGB 16.5 01/17/2025    HCT 48.3 01/17/2025    MCV 92.0 01/17/2025     01/17/2025       Lab Results   Component Value Date    SODIUM " 139 01/17/2025    K 3.9 01/17/2025     01/17/2025    CO2 31 01/17/2025    BUN 17 01/17/2025    CREATININE 0.85 01/17/2025    GLUC 86 01/17/2025    CALCIUM 9.8 01/17/2025    AST 17 01/17/2025    ALT 23 01/17/2025    ALKPHOS 26 (L) 01/17/2025    TP 7.1 01/17/2025    TBILI 1.0 01/17/2025    EGFR 115 01/17/2025

## 2025-07-01 LAB
ALBUMIN SERPL-MCNC: 4.6 G/DL (ref 3.6–5.1)
ALBUMIN/GLOB SERPL: 1.8 (CALC) (ref 1–2.5)
ALP SERPL-CCNC: 30 U/L (ref 36–130)
ALT SERPL-CCNC: 20 U/L (ref 9–46)
AST SERPL-CCNC: 18 U/L (ref 10–40)
BILIRUB SERPL-MCNC: 0.7 MG/DL (ref 0.2–1.2)
BUN SERPL-MCNC: 16 MG/DL (ref 7–25)
BUN/CREAT SERPL: ABNORMAL (CALC) (ref 6–22)
CALCIUM SERPL-MCNC: 9.8 MG/DL (ref 8.6–10.3)
CHLORIDE SERPL-SCNC: 103 MMOL/L (ref 98–110)
CO2 SERPL-SCNC: 32 MMOL/L (ref 20–32)
CREAT SERPL-MCNC: 0.83 MG/DL (ref 0.6–1.26)
CRP SERPL-MCNC: <3 MG/L
ERYTHROCYTE [DISTWIDTH] IN BLOOD BY AUTOMATED COUNT: 12.4 % (ref 11–15)
GFR/BSA.PRED SERPLBLD CYS-BASED-ARV: 116 ML/MIN/1.73M2
GLOBULIN SER CALC-MCNC: 2.6 G/DL (CALC) (ref 1.9–3.7)
GLUCOSE SERPL-MCNC: 70 MG/DL (ref 65–139)
HCT VFR BLD AUTO: 48.7 % (ref 38.5–50)
HGB BLD-MCNC: 16.5 G/DL (ref 13.2–17.1)
IGA SERPL-MCNC: 348 MG/DL (ref 47–310)
MCH RBC QN AUTO: 31.6 PG (ref 27–33)
MCHC RBC AUTO-ENTMCNC: 33.9 G/DL (ref 32–36)
MCV RBC AUTO: 93.3 FL (ref 80–100)
PLATELET # BLD AUTO: 221 THOUSAND/UL (ref 140–400)
PMV BLD REES-ECKER: 10.3 FL (ref 7.5–12.5)
POTASSIUM SERPL-SCNC: 4.1 MMOL/L (ref 3.5–5.3)
PROT SERPL-MCNC: 7.2 G/DL (ref 6.1–8.1)
RBC # BLD AUTO: 5.22 MILLION/UL (ref 4.2–5.8)
SODIUM SERPL-SCNC: 140 MMOL/L (ref 135–146)
TSH SERPL-ACNC: 1.29 MIU/L (ref 0.4–4.5)
TTG IGA SER-ACNC: <1 U/ML
WBC # BLD AUTO: 6.5 THOUSAND/UL (ref 3.8–10.8)